# Patient Record
Sex: FEMALE | Race: OTHER | HISPANIC OR LATINO | URBAN - METROPOLITAN AREA
[De-identification: names, ages, dates, MRNs, and addresses within clinical notes are randomized per-mention and may not be internally consistent; named-entity substitution may affect disease eponyms.]

---

## 2017-04-16 ENCOUNTER — EMERGENCY (EMERGENCY)
Facility: HOSPITAL | Age: 42
LOS: 1 days | Discharge: HOME | End: 2017-04-16
Attending: EMERGENCY MEDICINE
Payer: SELF-PAY

## 2017-04-16 VITALS
OXYGEN SATURATION: 100 % | HEIGHT: 65 IN | HEART RATE: 88 BPM | SYSTOLIC BLOOD PRESSURE: 128 MMHG | RESPIRATION RATE: 16 BRPM | WEIGHT: 158.07 LBS | TEMPERATURE: 98 F | DIASTOLIC BLOOD PRESSURE: 74 MMHG

## 2017-04-16 VITALS
HEART RATE: 80 BPM | DIASTOLIC BLOOD PRESSURE: 84 MMHG | RESPIRATION RATE: 18 BRPM | OXYGEN SATURATION: 99 % | TEMPERATURE: 98 F | SYSTOLIC BLOOD PRESSURE: 121 MMHG

## 2017-04-16 DIAGNOSIS — R41.82 ALTERED MENTAL STATUS, UNSPECIFIED: ICD-10-CM

## 2017-04-16 DIAGNOSIS — F32.9 MAJOR DEPRESSIVE DISORDER, SINGLE EPISODE, UNSPECIFIED: ICD-10-CM

## 2017-04-16 DIAGNOSIS — R69 ILLNESS, UNSPECIFIED: ICD-10-CM

## 2017-04-16 DIAGNOSIS — F43.21 ADJUSTMENT DISORDER WITH DEPRESSED MOOD: ICD-10-CM

## 2017-04-16 LAB
ALBUMIN SERPL ELPH-MCNC: 4.5 G/DL — SIGNIFICANT CHANGE UP (ref 3.3–5.2)
ALP SERPL-CCNC: 66 U/L — SIGNIFICANT CHANGE UP (ref 40–120)
ALT FLD-CCNC: 10 U/L — SIGNIFICANT CHANGE UP
AMPHET UR-MCNC: NEGATIVE — SIGNIFICANT CHANGE UP
ANION GAP SERPL CALC-SCNC: 12 MMOL/L — SIGNIFICANT CHANGE UP (ref 5–17)
APPEARANCE UR: ABNORMAL
AST SERPL-CCNC: 16 U/L — SIGNIFICANT CHANGE UP
BACTERIA # UR AUTO: ABNORMAL
BARBITURATES UR SCN-MCNC: NEGATIVE — SIGNIFICANT CHANGE UP
BASOPHILS # BLD AUTO: 0 K/UL — SIGNIFICANT CHANGE UP (ref 0–0.2)
BASOPHILS NFR BLD AUTO: 0.1 % — SIGNIFICANT CHANGE UP (ref 0–2)
BENZODIAZ UR-MCNC: NEGATIVE — SIGNIFICANT CHANGE UP
BILIRUB SERPL-MCNC: 0.4 MG/DL — SIGNIFICANT CHANGE UP (ref 0.4–2)
BILIRUB UR-MCNC: NEGATIVE — SIGNIFICANT CHANGE UP
BUN SERPL-MCNC: 16 MG/DL — SIGNIFICANT CHANGE UP (ref 8–20)
CALCIUM SERPL-MCNC: 9.3 MG/DL — SIGNIFICANT CHANGE UP (ref 8.6–10.2)
CHLORIDE SERPL-SCNC: 100 MMOL/L — SIGNIFICANT CHANGE UP (ref 98–107)
CO2 SERPL-SCNC: 27 MMOL/L — SIGNIFICANT CHANGE UP (ref 22–29)
COCAINE METAB.OTHER UR-MCNC: NEGATIVE — SIGNIFICANT CHANGE UP
COLOR SPEC: YELLOW — SIGNIFICANT CHANGE UP
CREAT SERPL-MCNC: 0.76 MG/DL — SIGNIFICANT CHANGE UP (ref 0.5–1.3)
DIFF PNL FLD: ABNORMAL
EOSINOPHIL # BLD AUTO: 0 K/UL — SIGNIFICANT CHANGE UP (ref 0–0.5)
EOSINOPHIL NFR BLD AUTO: 0.6 % — SIGNIFICANT CHANGE UP (ref 0–6)
EPI CELLS # UR: SIGNIFICANT CHANGE UP
GLUCOSE SERPL-MCNC: 96 MG/DL — SIGNIFICANT CHANGE UP (ref 70–115)
GLUCOSE UR QL: NEGATIVE MG/DL — SIGNIFICANT CHANGE UP
HCG SERPL-ACNC: <2 MIU/ML — SIGNIFICANT CHANGE UP
HCT VFR BLD CALC: 39.8 % — SIGNIFICANT CHANGE UP (ref 37–47)
HGB BLD-MCNC: 13.5 G/DL — SIGNIFICANT CHANGE UP (ref 12–16)
KETONES UR-MCNC: ABNORMAL
LEUKOCYTE ESTERASE UR-ACNC: ABNORMAL
LYMPHOCYTES # BLD AUTO: 1.5 K/UL — SIGNIFICANT CHANGE UP (ref 1–4.8)
LYMPHOCYTES # BLD AUTO: 18 % — LOW (ref 20–55)
MCHC RBC-ENTMCNC: 30.2 PG — SIGNIFICANT CHANGE UP (ref 27–31)
MCHC RBC-ENTMCNC: 33.9 G/DL — SIGNIFICANT CHANGE UP (ref 32–36)
MCV RBC AUTO: 89 FL — SIGNIFICANT CHANGE UP (ref 81–99)
METHADONE UR-MCNC: NEGATIVE — SIGNIFICANT CHANGE UP
MONOCYTES # BLD AUTO: 0.4 K/UL — SIGNIFICANT CHANGE UP (ref 0–0.8)
MONOCYTES NFR BLD AUTO: 5.2 % — SIGNIFICANT CHANGE UP (ref 3–10)
NEUTROPHILS # BLD AUTO: 6.4 K/UL — SIGNIFICANT CHANGE UP (ref 1.8–8)
NEUTROPHILS NFR BLD AUTO: 75.9 % — HIGH (ref 37–73)
NITRITE UR-MCNC: NEGATIVE — SIGNIFICANT CHANGE UP
OPIATES UR-MCNC: NEGATIVE — SIGNIFICANT CHANGE UP
PCP SPEC-MCNC: SIGNIFICANT CHANGE UP
PCP UR-MCNC: NEGATIVE — SIGNIFICANT CHANGE UP
PH UR: 7 — SIGNIFICANT CHANGE UP (ref 4.8–8)
PLATELET # BLD AUTO: 204 K/UL — SIGNIFICANT CHANGE UP (ref 150–400)
POTASSIUM SERPL-MCNC: 4.2 MMOL/L — SIGNIFICANT CHANGE UP (ref 3.5–5.3)
POTASSIUM SERPL-SCNC: 4.2 MMOL/L — SIGNIFICANT CHANGE UP (ref 3.5–5.3)
PROT SERPL-MCNC: 8.3 G/DL — SIGNIFICANT CHANGE UP (ref 6.6–8.7)
PROT UR-MCNC: 30 MG/DL
RBC # BLD: 4.47 M/UL — SIGNIFICANT CHANGE UP (ref 4.4–5.2)
RBC # FLD: 13.7 % — SIGNIFICANT CHANGE UP (ref 11–15.6)
RBC CASTS # UR COMP ASSIST: ABNORMAL /HPF (ref 0–4)
SODIUM SERPL-SCNC: 139 MMOL/L — SIGNIFICANT CHANGE UP (ref 135–145)
SP GR SPEC: 1.01 — SIGNIFICANT CHANGE UP (ref 1.01–1.02)
THC UR QL: NEGATIVE — SIGNIFICANT CHANGE UP
UROBILINOGEN FLD QL: NEGATIVE MG/DL — SIGNIFICANT CHANGE UP
WBC # BLD: 8.4 K/UL — SIGNIFICANT CHANGE UP (ref 4.8–10.8)
WBC # FLD AUTO: 8.4 K/UL — SIGNIFICANT CHANGE UP (ref 4.8–10.8)
WBC UR QL: >50

## 2017-04-16 PROCEDURE — 99284 EMERGENCY DEPT VISIT MOD MDM: CPT

## 2017-04-16 PROCEDURE — T1013: CPT

## 2017-04-16 PROCEDURE — 93005 ELECTROCARDIOGRAM TRACING: CPT

## 2017-04-16 PROCEDURE — 70450 CT HEAD/BRAIN W/O DYE: CPT | Mod: 26

## 2017-04-16 PROCEDURE — 80307 DRUG TEST PRSMV CHEM ANLYZR: CPT

## 2017-04-16 PROCEDURE — 99284 EMERGENCY DEPT VISIT MOD MDM: CPT | Mod: 25

## 2017-04-16 PROCEDURE — 80053 COMPREHEN METABOLIC PANEL: CPT

## 2017-04-16 PROCEDURE — 93010 ELECTROCARDIOGRAM REPORT: CPT

## 2017-04-16 PROCEDURE — 84702 CHORIONIC GONADOTROPIN TEST: CPT

## 2017-04-16 PROCEDURE — 81001 URINALYSIS AUTO W/SCOPE: CPT

## 2017-04-16 PROCEDURE — 70450 CT HEAD/BRAIN W/O DYE: CPT

## 2017-04-16 PROCEDURE — 85027 COMPLETE CBC AUTOMATED: CPT

## 2017-04-16 RX ORDER — FLUOXETINE HCL 10 MG
10 CAPSULE ORAL
Qty: 70 | Refills: 0 | OUTPATIENT
Start: 2017-04-16 | End: 2017-04-23

## 2017-04-16 RX ORDER — SODIUM CHLORIDE 9 MG/ML
1000 INJECTION INTRAMUSCULAR; INTRAVENOUS; SUBCUTANEOUS ONCE
Qty: 0 | Refills: 0 | Status: COMPLETED | OUTPATIENT
Start: 2017-04-16 | End: 2017-04-16

## 2017-04-16 RX ADMIN — SODIUM CHLORIDE 1000 MILLILITER(S): 9 INJECTION INTRAMUSCULAR; INTRAVENOUS; SUBCUTANEOUS at 16:30

## 2017-04-16 NOTE — ED ADULT TRIAGE NOTE - CHIEF COMPLAINT QUOTE
Pt family called EMS because was found laying in driveway. Pt with even and unlabored respirations, spo2 100% with stable VS and  but does not arouse to verbal and tactile stimuli. Daughter denies psych hx or medication, No known seizure HX. No external signs of trauma present.

## 2017-04-16 NOTE — ED BEHAVIORAL HEALTH ASSESSMENT NOTE - SUICIDE PROTECTIVE FACTORS
Identifies reasons for living/Future oriented/Supportive social network or family/Responsibility to family and others/Fear of death or dying due to pain/suffering/High spirituality/Engaged in work or school

## 2017-04-16 NOTE — ED ADULT NURSE NOTE - CAS EDN DISCHARGE ASSESSMENT
Alert and oriented to person, place and time Alert and oriented to person, place and time/Awake/Patient baseline mental status

## 2017-04-16 NOTE — ED PROVIDER NOTE - OBJECTIVE STATEMENT
The patient is a 41 year old female presents to ED after not responding to her daughter.  The patient came to ED initially with catatonia.  In ED, the patient is awake and states that she is depressed after her separation from her boyfriend.  Today she saw his formal boyfriend with other woman and felt more depressed.  The patient states that she has not eating or drinking well. +SI, No HI, No HA, No CP, No SOB, No ABD Pain.

## 2017-04-16 NOTE — ED BEHAVIORAL HEALTH ASSESSMENT NOTE - DESCRIPTION
Lives with family, employed None cleared from medical stand point, lab work and imaging including CT is clear.

## 2017-04-16 NOTE — ED ADULT NURSE REASSESSMENT NOTE - NS ED NURSE REASSESS COMMENT FT1
Pt  arrived to  AOx3, calm, and cooperative. Pt reports feeling depressed the last two weeks due to a recent breakup with boyfriend. Pt reports finding out ex boyfriend has a new girlfriend which caused her to have suicidal ideations that only lasted a short time, as per pt. Pt had a plan to cut her veins on her wrist. Pt denies any current suicidal ideation. Pt denies any HI or any A/V/H. Pt has no prior hx of inpatient hospitalizations. Pt has no prior PPH. Pt does not see a psychiatrist. Pt is on no medication and has no PMH. Pt has no past suicide attempts. Pt reports feeling suicidal once before when she was 18 over a similar breakup. Pt reports she has too many people who love her to take her life.

## 2017-04-16 NOTE — ED ADULT NURSE REASSESSMENT NOTE - COMFORT CARE
darkened lights/repositioned/wait time explained/po fluids offered/ambulated to bathroom/plan of care explained

## 2017-04-16 NOTE — ED BEHAVIORAL HEALTH ASSESSMENT NOTE - HPI (INCLUDE ILLNESS QUALITY, SEVERITY, DURATION, TIMING, CONTEXT, MODIFYING FACTORS, ASSOCIATED SIGNS AND SYMPTOMS)
Ms. Archer is a 41 year old  single female, from [facility], lives with family, employed, with no past psychiatric history and no medical history, BIBA for evaluation after she was found unresponsive.  Upon evaluation Ms. Archer was emotional, tearful at times, but forthcoming and cooperative.  She said that two ago she broke up with her boyfriend of 2 years and they agreed to be friends and a week ago she learnt that he is with another woman and she felt betrayed. she since then was feeling depressed, lost her appetite, eats less than she usually does, did not report any weight loss nonetheless. She mentioned that she however did not feel hopelessness, helplessness and did not miss any work days. She admitted that in the beginning of the week, she had thoughts of ending her life by slashing her veins, yet she admitted she did not take any steps towards that because "I fear God and I have my family who love me the most an I don't want to do such a thing to them". She currently denies adamantly any suicidal ideation or any thoughts of self harm.  She denies any symptoms suggestive of vasu or psychosis and none were elicited.  Ms. Archer denies any current use of alcohol. No tobacco smoking and no illicit drug use.  Collaterals from her daughter who accompanied her to the ED. She corroborated the above and denies any suicidal behavior and added that her mother goes to the gym as usual.  At this time, Patient poses low risk of danger to self and others, She is future oriented with strong family support. There is no current acute psychiatric symptoms that warrant admission to a psychiatric inpatient unit against her will. She was given an appointment on Monday 17th at 10 am with Family service

## 2017-04-16 NOTE — ED BEHAVIORAL HEALTH NOTE - BEHAVIORAL HEALTH NOTE
Aye: pt seen by psych(Dr Virgen) found to benefit from outpatient therapy. [facility] explained, understood and agreed to take appt for tomorrow at 10am.HIPPA form signed.Pt denies any SI/HI at this moment. Pt left in good spirits,pt's dghter will provide transportation for pt to go back home. No other concerns reported at this moment.

## 2017-04-16 NOTE — ED PROVIDER NOTE - CHPI ED SYMPTOMS NEG
no confusion/no weakness/no change in level of consciousness/no hallucinations/no paranoia/no agitation/no weight loss/no homicidal/no disorientation

## 2017-04-16 NOTE — ED BEHAVIORAL HEALTH ASSESSMENT NOTE - SUMMARY
41 year old  female, recently broke up with boyfriend. as a result she has been expressing depressive symptoms. She is employed and has family support

## 2019-12-18 NOTE — ED PROVIDER NOTE - AGGRAVATING FACTORS
CHIEF COMPLAINT: right femur pain s/p fall





PCP: Dr Ford





HISTORY OF PRESENT ILLNESS:


Pt is uncooperative and only moans to pain on palpation. History is as per ED 

notes


73 y/o female with a significant past medical history of  HTN, COPD, bipolar, 

hearing loss, who presents to the ED from [facility] for back 

pain, R hip pain and R thigh pain s/p fall. As per NH patient was found on the 

floor by staff. Pt has fallen 3x in the past week. The patient is a poor 

historian and unable to contribute to history





ER course was notable for:


(1)CBC with anemia 8.6/25.8 .2, CMP BUN 30.8/cr 3.5 PT/INR 12.4/1.05, 

AST 88    albumin 2.9


(2) UA neg, FOBT neg,  Haloperidol and ativan 


(3) CT of the head  - showed a mild right frontal scalp edema without skull 

fracture or intracranial hemorrhage


CT of cervical spine  - is negative for fracture or  prevertebral soft tissue 

swelling, degenerative changes in C2-C4


[facility] negative for acute fracture.





Recent Travel: unobtainable





PAST MEDICAL HISTORY: as above





PAST SURGICAL HISTORY: unobtainable





Social History:unobtainable





Allergies





No Known Allergies Allergy (Verified 12/17/19 23:06)


 








HOME MEDICATIONS:


 Home Medications











 Medication  Instructions  Recorded


 


Acetaminophen 650 mg PO QID PRN 12/17/19


 


Albuterol Sulfate Inhaler - 1 - 2 inh PO Q4H 12/17/19





[Ventolin Hfa Inhaler -]  


 


Albuterol Sulfate Inhaler - 1 puff IH QID 12/17/19





[Ventolin Hfa Inhaler -]  


 


Amlodipine Besylate 5 mg PO DAILY 12/17/19


 


Aspirin 81 mg PO DAILY 12/17/19


 


Calcitriol [Rocaltrol -] 0.25 mcg PO Q2D 12/17/19


 


Carvedilol [Coreg -] 12.5 mg PO BID 12/17/19


 


Fluoxetine HCl 60 mg PO DAILY 12/17/19


 


Lamotrigine [Lamictal] 200 mg PO DAILY 12/17/19


 


Olanzapine [Zyprexa -] 5 mg PO DAILY 12/17/19


 


Rosuvastatin Calcium [Crestor] 5 mg PO DAILY 12/17/19


 


Sodium Chloride [Nasadrops] 15 ml NS DAILY 12/17/19


 


Umeclidinium Brm/Vilanterol Tr 1 each IH DAILY 12/17/19





[Anoro Ellipta 62.5-25 Mcg INH]  








REVIEW OF SYSTEMS


CONSTITUTIONAL: 


Absent:  fever, chills, diaphoresis, generalized weakness, malaise, loss of 

appetite, weight change











PHYSICAL EXAMINATION


 Vital Signs - 24 hr











  12/17/19





  22:52


 


Temperature 97.1 F L


 


Pulse Rate 61


 


Respiratory 20





Rate 


 


Blood Pressure 166/52 L


 


O2 Sat by Pulse 97





Oximetry (%) 











GENERAL: in mod distress. somnolent on H&P intake


HEAD: Normal with no signs of trauma.


EYES:  sclera anicteric, conjunctiva clear. No lid lag.


NECK: Normal range of motion, supple without lymphadenopathy, JVD.


LUNGS: Breath sounds equal, clear to auscultation bilaterally. No wheezes, and 

no crackles. No accessory muscle use.


HEART: Regular rate and rhythm, normal S1 and S2 with systolic 3/6 murmur in 

aortic and pulmonary region.


ABDOMEN: Soft, nontender, not distended, normoactive bowel sounds, no guarding, 

no rebound, umbilical hernia near surgical ex lap scar .  No hepatomegaly or  

splenomegaly. 


MUSCULOSKELETAL: Normal range of motion at all joints except in R hip with 

tenderness over right hip and thigh. No bony deformities or leg length 

discrepancy . No CVA tenderness. pain elicited on log role test.


UPPER EXTREMITIES: 2+ pulses, warm, well-perfused. No cyanosis. No clubbing. No 

peripheral edema.


LOWER EXTREMITIES: 2+ pulses, warm, well-perfused. No calf tenderness. No 

peripheral edema. 





SKIN: Warm, dry, normal turgor, bruise below right knee.


 Laboratory Results - last 24 hr











  12/17/19 12/17/19 12/17/19





  23:39 23:39 23:39


 


WBC    7.9


 


RBC    2.55 L


 


Hgb    8.6 L


 


Hct    25.8 L


 


MCV    101.2 H


 


MCH    33.7


 


MCHC    33.3


 


RDW    13.7


 


Plt Count    208


 


MPV    10.2


 


Absolute Neuts (auto)    5.8


 


Neutrophils %    72.7


 


Lymphocytes %    16.7


 


Monocytes %    10.0


 


Eosinophils %    0.5


 


Basophils %    0.1


 


Nucleated RBC %    0


 


PT with INR  12.40  


 


INR  1.05  


 


PTT (Actin FS)  35.3  


 


Sodium   135 L 


 


Potassium   5.1 


 


Chloride   105 


 


Carbon Dioxide   23 


 


Anion Gap   7 L 


 


BUN   30.8 H 


 


Creatinine   3.5 H 


 


Est GFR (CKD-EPI)AfAm   14.03 


 


Est GFR (CKD-EPI)NonAf   12.11 


 


Random Glucose   112 H 


 


Calcium   9.7 


 


Total Bilirubin   0.8 


 


AST   88 H 


 


ALT   44 


 


Alkaline Phosphatase   246 H 


 


Creatine Kinase   


 


Creatine Kinase Index   


 


CK-MB (CK-2)   


 


Troponin I   


 


Total Protein   6.5 


 


Albumin   2.9 L 


 


Urine Color   


 


Urine Appearance   


 


Urine pH   


 


Ur Specific Gravity   


 


Urine Protein   


 


Urine Glucose (UA)   


 


Urine Ketones   


 


Urine Blood   


 


Urine Nitrite   


 


Urine Bilirubin   


 


Urine Urobilinogen   


 


Ur Leukocyte Esterase   


 


Urine WBC (Auto)   


 


Urine RBC (Auto)   


 


Urine Casts (Auto)   


 


U Epithel Cells (Auto)   


 


U Sm Round Cell (Auto)   


 


Urine Bacteria (Auto)   


 


Stool Occult Blood   














  12/17/19 12/18/19 12/18/19





  23:39 01:19 01:25


 


WBC   


 


RBC   


 


Hgb   


 


Hct   


 


MCV   


 


MCH   


 


MCHC   


 


RDW   


 


Plt Count   


 


MPV   


 


Absolute Neuts (auto)   


 


Neutrophils %   


 


Lymphocytes %   


 


Monocytes %   


 


Eosinophils %   


 


Basophils %   


 


Nucleated RBC %   


 


PT with INR   


 


INR   


 


PTT (Actin FS)   


 


Sodium   


 


Potassium   


 


Chloride   


 


Carbon Dioxide   


 


Anion Gap   


 


BUN   


 


Creatinine   


 


Est GFR (CKD-EPI)AfAm   


 


Est GFR (CKD-EPI)NonAf   


 


Random Glucose   


 


Calcium   


 


Total Bilirubin   


 


AST   


 


ALT   


 


Alkaline Phosphatase   


 


Creatine Kinase  232 H  


 


Creatine Kinase Index  1.3  


 


CK-MB (CK-2)  3.1  


 


Troponin I  < 0.02  


 


Total Protein   


 


Albumin   


 


Urine Color    Yellow


 


Urine Appearance    Clear


 


Urine pH    5.5


 


Ur Specific Gravity    1.015


 


Urine Protein    1+ H


 


Urine Glucose (UA)    Negative


 


Urine Ketones    Negative


 


Urine Blood    1+ H


 


Urine Nitrite    Negative


 


Urine Bilirubin    Negative


 


Urine Urobilinogen    1.0


 


Ur Leukocyte Esterase    Negative


 


Urine WBC (Auto)    2


 


Urine RBC (Auto)    1


 


Urine Casts (Auto)    6


 


U Epithel Cells (Auto)    5.3


 


U Sm Round Cell (Auto)    None


 


Urine Bacteria (Auto)    0.5


 


Stool Occult Blood   Negative 











ASSESSMENT/PLAN:


73 y/o female with a significant past medical history of  HTN, COPD, bipolar, 

hearing loss, who presents to the ED from [facility] for back 

pain, R hip pain and R thigh pain s/p fall with evidence of right frontal scalp 

edema but no fractures possibly due to syncopal episode.





Syncope


Telemetry observation


carotid U/S


echo 


orthostatics vs


Fall precautions


Bedrest





Fall possibly due to syncope


Xray negative for gross fracture


pt cont to moan on palpation and manipulation of R hip


pain on log role test


f/u official reads


tylenol 650 prn for pain control


Physical therapy evaluation





Anemia with Macrocytosis


 FOBT neg


 basic anemia work up 


 iron studies with ferritin


 retic count


 peripheral blood smear 


 B12 and folate


 TSH





GLORIA versus CKD or acute on chronic? pt has mild Ck elevation at 232


per chart pt has history of CKD but no baseline 


Renal U/S


NS@ 100


Avoid nephrotoxins


I's and O's


Daily weights





R leg swelling


duplex U/s to r/o DVT





Transaminitis


Liver U/S


Hepatitis B and hepatitis C serologies


monitor with cmp





DVT ppx


SCDs








Visit type





- Emergency Visit


Emergency Visit: Yes


ED Registration Date: 12/18/19


Care time: The patient presented to the Emergency Department on the above date 

and was hospitalized for further evaluation of their emergent condition.





- New Patient


This patient is new to me today: Yes


Date on this admission: 12/18/19





- Critical Care


Critical Care patient: No





ATTENDING PHYSICIAN STATEMENT





I saw and evaluated the patient.


I reviewed the resident's note and discussed the case with the resident.


I agree with the resident's findings and plan as documented.








SUBJECTIVE:








OBJECTIVE:








ASSESSMENT AND PLAN:
none

## 2024-11-22 ENCOUNTER — EMERGENCY (EMERGENCY)
Facility: HOSPITAL | Age: 49
LOS: 1 days | Discharge: HOME | End: 2024-11-22
Attending: STUDENT IN AN ORGANIZED HEALTH CARE EDUCATION/TRAINING PROGRAM
Payer: SELF-PAY

## 2024-11-22 VITALS
HEART RATE: 84 BPM | TEMPERATURE: 98 F | SYSTOLIC BLOOD PRESSURE: 145 MMHG | WEIGHT: 184.97 LBS | DIASTOLIC BLOOD PRESSURE: 92 MMHG | RESPIRATION RATE: 14 BRPM | OXYGEN SATURATION: 97 %

## 2024-11-22 PROCEDURE — 99284 EMERGENCY DEPT VISIT MOD MDM: CPT

## 2024-11-22 NOTE — ED ADULT TRIAGE NOTE - WEIGHT METHOD
Problem: Adult Mental Health  Intervention: Implement and maintain protective environment  Safety checks, suicide precautions    Nursing Suicide Assessment Note - Inpatient    Current assessment:    Current C-SSRS score: Negative screen= no ideation, behaviors or history      Protective Factors / Reason for Living: Responsibility to children, Social supports    Interventions:   · Maintain current plan of care.   ·   Goal: Denies having a suicidal plan  Outcome: Outcome Met, Continue evaluating goal progress toward completion  Pt denies having suicidal ideations at this time, agrees to seek out staff if thoughts were to change.    Comments: Pt irritable on contact, sad, depressed, tearful.  Pt requesting to have phone be brought up, wanting to get her number for her boss, pt had wanted to text him about not being able to be at work tomorrow.  Writer explained she may get the phone number but could not text, pt became irritable, states \"I don't use the phone to call, I only text because I get so anxious\".  Writer offered PRN hydroxyzine, pt refused, states \"no one is doing anything for me here; I guess I'll just lose my kids like I lose everything else\".  Pt has not attended groups, has been eating meals in her room.  Pt reports wanting to leave as soon as possible.   stated

## 2024-11-22 NOTE — ED ADULT NURSE NOTE - OBJECTIVE STATEMENT
Assumed pt care at this time, pt presents to ED c/o fall off of a ladder on the 3rd step, reports she felt dizzy and fell back. No head strike or LOC. No obvious deformity noted. RR even and unlabored on room air, no apparent distress noted at this time.

## 2024-11-23 VITALS
TEMPERATURE: 98 F | SYSTOLIC BLOOD PRESSURE: 124 MMHG | DIASTOLIC BLOOD PRESSURE: 80 MMHG | RESPIRATION RATE: 18 BRPM | HEART RATE: 91 BPM | OXYGEN SATURATION: 97 %

## 2024-11-23 PROCEDURE — T1013: CPT

## 2024-11-23 PROCEDURE — 72131 CT LUMBAR SPINE W/O DYE: CPT | Mod: 26,MC

## 2024-11-23 PROCEDURE — 72131 CT LUMBAR SPINE W/O DYE: CPT | Mod: MC

## 2024-11-23 PROCEDURE — 99284 EMERGENCY DEPT VISIT MOD MDM: CPT

## 2024-11-23 RX ORDER — KETOROLAC TROMETHAMINE 30 MG/ML
15 INJECTION INTRAMUSCULAR; INTRAVENOUS ONCE
Refills: 0 | Status: DISCONTINUED | OUTPATIENT
Start: 2024-11-23 | End: 2024-11-23

## 2024-11-23 RX ORDER — ACETAMINOPHEN 500 MG
975 TABLET ORAL ONCE
Refills: 0 | Status: COMPLETED | OUTPATIENT
Start: 2024-11-23 | End: 2024-11-23

## 2024-11-23 RX ADMIN — Medication 975 MILLIGRAM(S): at 02:55

## 2024-11-23 NOTE — ED PROVIDER NOTE - CLINICAL SUMMARY MEDICAL DECISION MAKING FREE TEXT BOX
---------  Silva Tee MD, Attending  48 yo Fno si gpmhx, pw fall at 6:30pm, pt was on third step of ladder, scared of heights, cuasing her to feel dizzy and fall onback. Pt ambulating, complains of midline low back pain.    + midline Lspinettp. ambulatory.     Gen: Well appearing in NAD   Head: NC/AT  Neck: trachea midline  Resp:  No distress  Ext: no deformities  Neuro:  A&O appears non focal  Skin:  Warm and dry as visualized  Psych:  Normal affect and mood    ddx includes, but is not limited to the following:  plan:  update: see progress notes ---------  Silva Tee MD, Attending  50 yo F no sig pmhx pw fall at 6:30pm. Pt was on third step of ladder while carrying a box. Pt is scared of heights, causing her to feel dizzy, which caused her to  and fall backwards off the ladder. Pt ambulating, but complains of significant midline lumbar pain.  Gen: Well appearing in NAD   Head: NC/AT  Neck: trachea midline  Resp:  No distress  Back: no midline C T spine ttp, + midline middle and low lumbar spine ttp.   Ext: no deformities, no LE weakness  Neuro:  A&O appears non focal, no saddle anesthesia, normal gait  Skin:  Warm and dry as visualized  Psych:  Normal affect and mood    ddx includes, but is not limited to the following: compression fracture, lower suspicion for cauda equina or cord compression given  plan: CT L spine, analgesia, reassess for need for consults or further imaging.

## 2024-11-23 NOTE — ED PROVIDER NOTE - NSFOLLOWUPINSTRUCTIONS_ED_ALL_ED_FT
** Take over the counter Tylenol or Ibuprofen for pain -- follow dosing directions on original bottle.      If you continue to have pain, you can follow up with the spine doctor. Call to make an appointment.     ** Go to the nearest Emergency Department if you experience any new or concerning symptoms, such as:   - worsening pain  - chest pain  - difficulty breathing  - passing out  - unable to eat or drink  - unable to move or feel part of your body  - fever, chills

## 2024-11-23 NOTE — ED PROVIDER NOTE - CARE PROVIDER_API CALL
Kt Tejada  Neurosurgery  [address], [address]  [place] [address]  Phone: [phone]  Fax: [phone]  Follow Up Time: Routine

## 2024-11-23 NOTE — ED PROVIDER NOTE - PATIENT PORTAL LINK FT
You can access the FollowMyHealth Patient Portal offered by [facility] by registering at the following website: [email or web address]. By joining "[employer]’s FollowMyHealth portal, you will also be able to view your health information using other applications (apps) compatible with our system.